# Patient Record
Sex: FEMALE | Race: WHITE | NOT HISPANIC OR LATINO | URBAN - NONMETROPOLITAN AREA
[De-identification: names, ages, dates, MRNs, and addresses within clinical notes are randomized per-mention and may not be internally consistent; named-entity substitution may affect disease eponyms.]

---

## 2023-03-06 DIAGNOSIS — E03.9 HYPOTHYROIDISM, UNSPECIFIED: ICD-10-CM

## 2023-03-09 RX ORDER — LEVOTHYROXINE SODIUM 137 UG/1
TABLET ORAL
Qty: 90 TABLET | Refills: 3 | Status: SHIPPED | OUTPATIENT
Start: 2023-03-09

## 2024-10-04 ENCOUNTER — HOSPITAL ENCOUNTER (OUTPATIENT)
Dept: RADIOLOGY | Facility: HOSPITAL | Age: 44
Discharge: HOME | End: 2024-10-04
Payer: COMMERCIAL

## 2024-10-04 ENCOUNTER — APPOINTMENT (OUTPATIENT)
Dept: RADIOLOGY | Facility: HOSPITAL | Age: 44
End: 2024-10-04

## 2024-10-04 VITALS — HEIGHT: 66 IN | WEIGHT: 212 LBS | BODY MASS INDEX: 34.07 KG/M2

## 2024-10-04 DIAGNOSIS — Z12.31 ENCOUNTER FOR SCREENING MAMMOGRAM FOR MALIGNANT NEOPLASM OF BREAST: ICD-10-CM

## 2024-10-04 DIAGNOSIS — C73 MALIGNANT NEOPLASM OF THYROID GLAND (MULTI): ICD-10-CM

## 2024-10-04 PROCEDURE — 77067 SCR MAMMO BI INCL CAD: CPT

## 2024-10-04 PROCEDURE — 76536 US EXAM OF HEAD AND NECK: CPT

## 2024-11-04 ENCOUNTER — APPOINTMENT (OUTPATIENT)
Dept: OTOLARYNGOLOGY | Facility: CLINIC | Age: 44
End: 2024-11-04
Payer: COMMERCIAL

## 2024-11-04 DIAGNOSIS — H93.13 TINNITUS OF BOTH EARS: Primary | ICD-10-CM

## 2024-11-04 DIAGNOSIS — H91.93 DECREASED HEARING OF BOTH EARS: ICD-10-CM

## 2024-11-04 NOTE — PROGRESS NOTES
"History Of Present Illness  Trice Magana is a 44 y.o. female presenting with: \"  \".  She is kindly referred by .      Past Medical History  She has a past medical history of Personal history of other endocrine, nutritional and metabolic disease (10/30/2020).    Surgical History  She has a past surgical history that includes Other surgical history (03/30/2020) and Hysterectomy.     Social History  She has no history on file for tobacco use, alcohol use, and drug use.    Family History  No family history on file.     Allergies  Patient has no allergy information on record.    Review of Systems        Physical Exam    General appearance: Healthy-appearing, well-nourished, well groomed, in no acute distress.     Head and Face: Atraumatic with no masses, lesions, or scarring.      Salivary glands: No tenderness of the parotid glands or parotid masses.     No tenderness of the submandibular glands or submandibular masses.      Facial strength: Normal strength and symmetry, no synkinesis or facial tic.     Eyes: Conjunctivas look non-hyperemic bilaterally    Ears: Bilaterally ear canals look normal. Tympanic membranes look intact, no hyperemia, fluid or retraction. Hearing grossly normal.      Nose: Mucosa looks normal. No purulent discharge. Septum essentially straight.     Oral Cavity/Mouth: Lips and tongue look normal.     Throat: No postnasal discharge. No tonsil hypertrophy. No hyperemia.    Neck: Symmetrical, trachea midline.     Pulmonary: Normal respiratory effort.     Lymphatic: No palpable pathologic lymph nodes at neck.     Neurological/Psychiatric Orientation to person, place, and time: Normal.     Mood and affect: Normal.      Extremities: No clubbing.     Skin: No significant skin lesions were noted at face or neck        Procedure         Last Recorded Vitals  There were no vitals taken for this visit.    Relevant Results    Assessment and Plan:    Srikanth Crabtree  Otolaryngology - Head & Neck " Surgery

## 2024-11-04 NOTE — PROGRESS NOTES
History Of Present Illness  Trice Magana is a 44 y.o. female, who presents for decreased hearing and tinnitus.  She has constant tinnitus in her ears for about six months.   When she chews on her right side, she hears a high pitch sound in her right ear.    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  Uses bupropion, omeprazole (may cause tinnitus), levothyroxine, vit D2,  She has DM type 2, uses farxiga.    On examination, TMs look intact bilaterally. No visible infection, retraction or effusion.    Plan:  1- hearing test      Past Medical History  She has a past medical history of Personal history of other endocrine, nutritional and metabolic disease (10/30/2020).    Surgical History  She has a past surgical history that includes Other surgical history (03/30/2020) and Hysterectomy.     Social History  She has no history on file for tobacco use, alcohol use, and drug use.    Family History  No family history on file.     Allergies  Patient has no allergy information on record.    Review of Systems   Difficulty with hearing  Ear pain     Physical Exam    General appearance: Healthy-appearing, well-nourished, well groomed, in no acute distress.     Head and Face: Atraumatic with no masses, lesions, or scarring.      Salivary glands: No tenderness of the parotid glands or parotid masses.     No tenderness of the submandibular glands or submandibular masses.      Facial strength: Normal strength and symmetry, no synkinesis or facial tic.     Eyes: Conjunctivas look non-hyperemic bilaterally    Ears: Bilaterally ear canals look normal. Tympanic membranes look intact, no hyperemia, fluid or retraction. Hearing grossly normal.      Nose: Mucosa looks normal. No purulent discharge.     Oral Cavity/Mouth: Lips and tongue look normal.     Throat: No postnasal discharge. No tonsil hypertrophy. No hyperemia.    Neck: Symmetrical, trachea midline.     Pulmonary: Normal respiratory effort.     Lymphatic: No palpable  pathologic lymph nodes at neck.     Neurological/Psychiatric Orientation to person, place, and time: Normal.     Mood and affect: Normal.      Extremities: No clubbing.     Skin: No significant skin lesions were noted at face or neck        Procedure       Last Recorded Vitals  There were no vitals taken for this visit.    Relevant Results  Prior to Admission medications    Medication Sig Start Date End Date Taking? Authorizing Provider   levothyroxine (Synthroid, Levoxyl) 137 mcg tablet TAKE 1 TABLET BY MOUTH ONCE DAILY 3/9/23   Taryn Massey,      No results found.      Assessment and Plan:  Trice Magana is a 44 y.o. female, who presents for decreased hearing and tinnitus.  She has constant tinnitus in her ears for about six months.   When she chews on her right side, she hears a high pitch sound in her right ear.    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  Uses bupropion, omeprazole, levothyroxine, vit D2,  She has DM type 2, uses farxiga.    On examination, TMs look intact bilaterally. No visible infection, retraction or effusion.    Plan:  1- hearing test      Srikanth Crabtree MD  Otolaryngology - Head & Neck Surgery

## 2024-12-02 ENCOUNTER — APPOINTMENT (OUTPATIENT)
Dept: AUDIOLOGY | Facility: CLINIC | Age: 44
End: 2024-12-02
Payer: COMMERCIAL

## 2024-12-02 ENCOUNTER — APPOINTMENT (OUTPATIENT)
Dept: OTOLARYNGOLOGY | Facility: CLINIC | Age: 44
End: 2024-12-02
Payer: COMMERCIAL

## 2024-12-23 ENCOUNTER — APPOINTMENT (OUTPATIENT)
Dept: AUDIOLOGY | Facility: CLINIC | Age: 44
End: 2024-12-23
Payer: COMMERCIAL

## 2024-12-23 ENCOUNTER — APPOINTMENT (OUTPATIENT)
Dept: OTOLARYNGOLOGY | Facility: CLINIC | Age: 44
End: 2024-12-23
Payer: COMMERCIAL

## 2024-12-23 DIAGNOSIS — R94.120 ABNORMAL OTOACOUSTIC EMISSIONS TEST: ICD-10-CM

## 2024-12-23 DIAGNOSIS — H90.3 SENSORINEURAL HEARING LOSS (SNHL) OF BOTH EARS: Primary | ICD-10-CM

## 2024-12-23 DIAGNOSIS — H61.21 IMPACTED CERUMEN OF RIGHT EAR: ICD-10-CM

## 2024-12-23 DIAGNOSIS — H93.19 TINNITUS, UNSPECIFIED LATERALITY: ICD-10-CM

## 2024-12-23 DIAGNOSIS — H93.13 SUBJECTIVE TINNITUS OF BOTH EARS: ICD-10-CM

## 2024-12-23 PROCEDURE — 92557 COMPREHENSIVE HEARING TEST: CPT | Performed by: AUDIOLOGIST

## 2024-12-23 PROCEDURE — 92550 TYMPANOMETRY & REFLEX THRESH: CPT | Performed by: AUDIOLOGIST

## 2024-12-23 PROCEDURE — 99212 OFFICE O/P EST SF 10 MIN: CPT | Performed by: OTOLARYNGOLOGY

## 2024-12-23 PROCEDURE — 69210 REMOVE IMPACTED EAR WAX UNI: CPT | Performed by: OTOLARYNGOLOGY

## 2024-12-23 NOTE — PROGRESS NOTES
History Of Present Illness    12.23.2024: Wax was cleaned from right ear canal. She had her hearing test today, which shows presbycusis pattern of sensorineural hearing loss bilaterally.    Plan  1-consider hearing aids  2-consider Ménière's tinnitus treatment  _________________________________________________________________    Trice Magana is a 44 y.o. female, who presents for decreased hearing and tinnitus.  She has constant tinnitus in her ears for about six months.   When she chews on her right side, she hears a high pitch sound in her right ear.    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  Uses bupropion, omeprazole (may cause tinnitus), levothyroxine, vit D2,  She has DM type 2, uses farxiga.    On examination, TMs look intact bilaterally. No visible infection, retraction or effusion.    Plan:  1- hearing test      Past Medical History  She has a past medical history of Personal history of other endocrine, nutritional and metabolic disease (10/30/2020).    Surgical History  She has a past surgical history that includes Other surgical history (03/30/2020) and Hysterectomy.     Social History   has no history on file for tobacco use, alcohol use, and drug use.    Family History  No family history on file.     Allergies  Patient has no allergy information on record.    Review of Systems (initial ROS)  Difficulty with hearing  Ear pain     Physical Exam (initial exam)    General appearance: Healthy-appearing, well-nourished, well groomed, in no acute distress.     Head and Face: Atraumatic with no masses, lesions, or scarring.      Salivary glands: No tenderness of the parotid glands or parotid masses.     No tenderness of the submandibular glands or submandibular masses.      Facial strength: Normal strength and symmetry, no synkinesis or facial tic.     Eyes: Conjunctivas look non-hyperemic bilaterally    Ears: Bilaterally ear canals look normal. Tympanic membranes look intact, no hyperemia,  fluid or retraction. Hearing grossly normal.      Nose: Mucosa looks normal. No purulent discharge.     Oral Cavity/Mouth: Lips and tongue look normal.     Throat: No postnasal discharge. No tonsil hypertrophy. No hyperemia.    Neck: Symmetrical, trachea midline.     Pulmonary: Normal respiratory effort.     Lymphatic: No palpable pathologic lymph nodes at neck.     Neurological/Psychiatric Orientation to person, place, and time: Normal.     Mood and affect: Normal.      Extremities: No clubbing.     Skin: No significant skin lesions were noted at face or neck      Procedure  EAR WAX REMOVAL 12.23.2024  Patient had right ear wax. Using small instrument(s) and/or suction cleaning was done. Patient tolerated the procedure well.        Last Recorded Vitals  There were no vitals taken for this visit.    Relevant Results  Prior to Admission medications    Medication Sig Start Date End Date Taking? Authorizing Provider   levothyroxine (Synthroid, Levoxyl) 137 mcg tablet TAKE 1 TABLET BY MOUTH ONCE DAILY 3/9/23   Taryn Massey, DO     No results found.      Assessment and Plan:    12.23.2024: Wax was cleaned from right ear canal. She had her hearing test today, which shows presbycusis pattern of sensorineural hearing loss bilaterally.    Plan  1-consider hearing aids  2-consider Ménière's tinnitus treatment  _________________________________________________________________    Trice Magana is a 44 y.o. female, who presents for decreased hearing and tinnitus.  She has constant tinnitus in her ears for about six months.   When she chews on her right side, she hears a high pitch sound in her right ear.    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  Uses bupropion, omeprazole, levothyroxine, vit D2,  She has DM type 2, uses farxiga.    On examination, TMs look intact bilaterally. No visible infection, retraction or effusion.    Plan:  1- hearing test      Srikanth Crabtree MD  Otolaryngology - Head & Neck  Surgery

## 2024-12-23 NOTE — PROGRESS NOTES
Trice Magana, age 44 years, is here today for a hearing evaluation.  Type 2 diabetes diagnosis and history of thyroid cancer    Difficulty hearing - yes, both ears for the past couple of years  Tinnitus - yes, both ears for the past 8 months  Excessive noise exposure - yes, occupational ( for the past 2 years)  Chronic ear infections - no  Ear pain - yes, left ear for the past 5 days  Ear drainage - no  Past ear surgery - no  Vertigo - no  Dizziness - no  Past hearing aid use - no  Family history - yes, father and paternal grandfather/grandmother    Appointment time: 11:50-12:30    Otoscopy revealed clear ear canals with visual inspection of the tympanic membranes bilaterally.    Behavioral hearing evaluation:  Right ear - borderline mild sloping to severe sensorineural hearing loss 125-8000 Hz  Left ear - borderline mild sloping to profound sensorineural hearing loss 125-8000 Hz    Speech reception thresholds obtained at a level consistent with pure tone thresholds bilaterally.    Word discrimination:  Right ear - excellent (96%)  Left ear - excellent (92%)    Tympanometry:  Right ear - Type A, normal middle ear function  Left ear - Type A, normal middle ear function    Ipsilateral acoustic reflexes:  Probe right - present 500-4000 Hz  Probe left - present 500-4000 Hz    Contralateral acoustic reflexes:  Probe right - present 500-4000 Hz  Probe left - present 500-4000 Hz  The presence of acoustic reflexes within normal intensity limits is consistent with normal middle ear and brainstem function, and suggests that auditory sensitivity is not significantly impaired.     Distortion Product Otoacoustic Emissions (DPOAEs):  Right ear - absent 8610-0650 Hz  Left ear - present at 2000 Hz and absent 7769-2336 Hz  Present DPOAEs are consistent with normal cochlear outer hair cell function at those frequencies    Recommendations:  1) Re-evaluate hearing annually, to monitor, or sooner if a change in hearing is  noted  2) Follow up with Dr Crabtree regarding bilateral, sensorineural hearing loss  3) Hearing aids for both ears - she will need to be fit at an outside facility as we are not in network for hearing aids through her insurance

## 2025-05-28 ENCOUNTER — OFFICE VISIT (OUTPATIENT)
Facility: CLINIC | Age: 45
End: 2025-05-28
Payer: COMMERCIAL

## 2025-05-28 VITALS
RESPIRATION RATE: 16 BRPM | DIASTOLIC BLOOD PRESSURE: 70 MMHG | WEIGHT: 198.6 LBS | SYSTOLIC BLOOD PRESSURE: 112 MMHG | HEART RATE: 78 BPM | HEIGHT: 66 IN | BODY MASS INDEX: 31.92 KG/M2

## 2025-05-28 DIAGNOSIS — C73 THYROID CANCER (MULTI): ICD-10-CM

## 2025-05-28 DIAGNOSIS — E11.9 TYPE 2 DIABETES MELLITUS WITHOUT COMPLICATION, WITHOUT LONG-TERM CURRENT USE OF INSULIN: Primary | ICD-10-CM

## 2025-05-28 DIAGNOSIS — E89.0 POSTOPERATIVE HYPOTHYROIDISM: ICD-10-CM

## 2025-05-28 PROBLEM — E66.9 OBESITY: Status: ACTIVE | Noted: 2017-03-24

## 2025-05-28 PROBLEM — L68.0 HIRSUTISM: Status: ACTIVE | Noted: 2025-05-28

## 2025-05-28 PROCEDURE — 3074F SYST BP LT 130 MM HG: CPT | Performed by: INTERNAL MEDICINE

## 2025-05-28 PROCEDURE — 99214 OFFICE O/P EST MOD 30 MIN: CPT | Performed by: INTERNAL MEDICINE

## 2025-05-28 PROCEDURE — 3008F BODY MASS INDEX DOCD: CPT | Performed by: INTERNAL MEDICINE

## 2025-05-28 PROCEDURE — 99204 OFFICE O/P NEW MOD 45 MIN: CPT | Performed by: INTERNAL MEDICINE

## 2025-05-28 PROCEDURE — 3078F DIAST BP <80 MM HG: CPT | Performed by: INTERNAL MEDICINE

## 2025-05-28 RX ORDER — FAMOTIDINE 40 MG/1
TABLET, FILM COATED ORAL
COMMUNITY
Start: 2025-04-14

## 2025-05-28 RX ORDER — DAPAGLIFLOZIN 5 MG/1
TABLET, FILM COATED ORAL
COMMUNITY
Start: 2025-05-13

## 2025-05-28 RX ORDER — OMEPRAZOLE 40 MG/1
1 CAPSULE, DELAYED RELEASE ORAL
COMMUNITY
Start: 2025-05-13

## 2025-05-28 RX ORDER — ERGOCALCIFEROL 1.25 MG/1
1.25 CAPSULE ORAL
COMMUNITY

## 2025-05-28 RX ORDER — BUPROPION HYDROCHLORIDE 150 MG/1
150 TABLET ORAL DAILY
COMMUNITY

## 2025-05-28 RX ORDER — TIRZEPATIDE 2.5 MG/.5ML
2.5 INJECTION, SOLUTION SUBCUTANEOUS
Qty: 2 ML | Refills: 0 | Status: SHIPPED | OUTPATIENT
Start: 2025-05-28 | End: 2026-05-28

## 2025-05-28 RX ORDER — BUPROPION HYDROCHLORIDE 300 MG/1
300 TABLET ORAL DAILY
COMMUNITY

## 2025-05-28 ASSESSMENT — ENCOUNTER SYMPTOMS
NAUSEA: 0
VOMITING: 0
FEVER: 0
HEADACHES: 0
SHORTNESS OF BREATH: 0
DIARRHEA: 0
FATIGUE: 0
CHILLS: 0
PALPITATIONS: 0
COUGH: 0

## 2025-05-28 NOTE — LETTER
May 28, 2025     Skye Bunch PA-C  [address] [facility] [address]    Patient: Trice Magana   YOB: 1980   Date of Visit: 5/28/2025       Dear Dr. Skye Bunch PA-C:    Thank you for referring Trice Magana to me for evaluation. Below are my notes for this consultation.  If you have questions, please do not hesitate to call me. I look forward to following your patient along with you.       Sincerely,     Courtney Handley MD      CC: No Recipients  ______________________________________________________________________________________    Endocrinology New Patient Consult  Subjective  Patient ID: Trice Magana is a 44 y.o. female who presents for Hypothyroidism and Diabetes. Patient was referred by Skye Bunch PA-C    PCP: Skye Bunch PA-C    HPI  44-year-old referred by ESTHER THOMAS for evaluation of hypothyroidism papillary thyroid cancer and type 2 diabetes.  She underwent a total thyroidectomy in 2020 that showed multifocal papillary microcarcinoma with 2 foci on the right : 0.7 and 0.6 cm with no invasion or extension so radioactive iodine was not necessary.  She has had periodic blood work and ultrasound since.  She has been on thyroid medication at 137 mcg for the last few months.  She does take it in the morning on an empty stomach.  She does not take biotin.  Her most recent ultrasound in October 2024 was negative.  She has not had a thyroglobulin in a few years but it has been historically negative.  She has also been type II diabetic for about 2 years.  She was tried on metformin but was intolerant.  She is currently on Farxiga and her most recent A1c was 6.3.  She has struggled quite a bit with her weight and would like to lose.  She has been trying to work on her diet and exercise.  She is due for an eye exam and plans to schedule    Review of Systems   Constitutional:  Negative for  chills, fatigue and fever.   Respiratory:  Negative for cough and shortness of breath.    Cardiovascular:  Negative for chest pain and palpitations.   Gastrointestinal:  Negative for diarrhea, nausea and vomiting.   Neurological:  Negative for headaches.       Problem List[1]    Medical History[2]     Surgical History[3]     Tobacco Use History[4]   Social History     Substance and Sexual Activity   Alcohol Use None      Marital status:   Employment: [employer]     Family History[5]     Home Meds:  Current Outpatient Medications   Medication Instructions   • buPROPion XL (WELLBUTRIN XL) 150 mg, Daily   • buPROPion XL (WELLBUTRIN XL) 300 mg, Daily   • ergocalciferol (VITAMIN D-2) 1.25 mg, Once Weekly   • famotidine (Pepcid) 40 mg tablet TAKE 1 TABLET BY MOUTH ONCE DAILY FOR ACID REFLUX   • Farxiga 5 mg tablet TAKE 1 TABLET BY MOUTH ONCE DAILY FOR BLOOD SUGAR   • levothyroxine (Synthroid, Levoxyl) 137 mcg tablet TAKE 1 TABLET BY MOUTH ONCE DAILY   • omeprazole (PriLOSEC) 40 mg DR capsule 1 capsule, Daily (0630)        RX Allergies[6]     Objective  Vitals:    05/28/25 1202   BP: 112/70   Pulse: 78   Resp: 16      Vitals:    05/28/25 1202   Weight: 90.1 kg (198 lb 9.6 oz)      Body mass index is 32.05 kg/m².   Physical Exam  Constitutional:       Appearance: Normal appearance. She is overweight.   HENT:      Head: Normocephalic and atraumatic.   Neck:      Comments: No palpable thyroid gland  Cardiovascular:      Rate and Rhythm: Normal rate and regular rhythm.      Heart sounds: No murmur heard.     No gallop.   Pulmonary:      Effort: Pulmonary effort is normal.      Breath sounds: Normal breath sounds.   Abdominal:      Palpations: Abdomen is soft.      Comments: benign   Neurological:      General: No focal deficit present.      Mental Status: She is alert and oriented to person, place, and time.      Deep Tendon Reflexes: Reflexes are normal and symmetric.   Psychiatric:         Behavior: Behavior is  "cooperative.         Labs:  Lab Results   Component Value Date    HGBA1C 7.1 (A) 09/08/2022    TSH 1.01 09/08/2022    FREET4 1.31 (H) 06/15/2020      No results found for: \"PR1\", \"THYROIDPAB\", \"TSI\"     Assessment/Plan  Assessment & Plan  Type 2 diabetes mellitus without complication, without long-term current use of insulin         Postoperative hypothyroidism         Thyroid cancer (Multi)    44-year-old here for evaluation of hypothyroidism status post total thyroidectomy for thyroid cancer.  Also with controlled type 2 diabetes.  We discussed her course.  We reviewed usual course of thyroid cancer management.  I would recommend a year 5 ultrasound later this year and then yearly blood work with thyroglobulin testing will be sufficient.  I will put in her ultrasound today for later this year and we will do thyroid blood work in the near future as she likely needs further adjustment in her dosing.  We will include a thyroglobulin with TSH.  In regards to her diabetes she has been struggling with her weight and she would like to pursue GLP-1 agents.  She has no personal or family history of medullary thyroid cancer  or a personal history of pancreatitis so she would be a candidate.  We will call in [facility] for her.  We did discuss monthly titration.  I encouraged her to work on diet and exercise.  I will see her back in 6 months    Electronically signed by:  Courtney Handley MD 05/28/25  9:22 PM         [1]  Patient Active Problem List  Diagnosis   • Diabetes type 2   • Dyslipidemia   • Elevated fasting blood sugar   • Elevated testosterone level in female   • Gastroesophageal reflux disease   • Hirsutism   • Hypothyroidism   • Multiple thyroid nodules   • Obesity   • Thyroid cancer (Multi)   • Tobacco use disorder   • Vitamin D deficiency   [2]  Past Medical History:  Diagnosis Date   • Personal history of other endocrine, nutritional and metabolic disease 10/30/2020    History of thyroid nodule   [3]  Past Surgical " History:  Procedure Laterality Date   • HYSTERECTOMY     • OTHER SURGICAL HISTORY  03/30/2020    Hysterectomy   [4]  Social History  Tobacco Use   Smoking Status Every Day   • Types: Cigarettes   Smokeless Tobacco Never   Tobacco Comments    Pt vapes   [5]  No family history on file.  [6]  No Known Allergies       [1]  Patient Active Problem List  Diagnosis   • Diabetes type 2   • Dyslipidemia   • Elevated fasting blood sugar   • Elevated testosterone level in female   • Gastroesophageal reflux disease   • Hirsutism   • Hypothyroidism   • Multiple thyroid nodules   • Obesity   • Thyroid cancer (Multi)   • Tobacco use disorder   • Vitamin D deficiency   [2]  Past Medical History:  Diagnosis Date   • Personal history of other endocrine, nutritional and metabolic disease 10/30/2020    History of thyroid nodule   [3]  Past Surgical History:  Procedure Laterality Date   • HYSTERECTOMY     • OTHER SURGICAL HISTORY  03/30/2020    Hysterectomy   [4]  Social History  Tobacco Use   Smoking Status Every Day   • Types: Cigarettes   Smokeless Tobacco Never   Tobacco Comments    Pt vapes   [5]  No family history on file.  [6]  No Known Allergies

## 2025-05-28 NOTE — PROGRESS NOTES
Endocrinology New Patient Consult  Subjective   Patient ID: Trice Magana is a 44 y.o. female who presents for Hypothyroidism and Diabetes. Patient was referred by Skye Bunch PA-C    PCP: Skye Bunch PA-C    HPI  44-year-old referred by ESTHER THOMAS for evaluation of hypothyroidism papillary thyroid cancer and type 2 diabetes.  She underwent a total thyroidectomy in 2020 that showed multifocal papillary microcarcinoma with 2 foci on the right : 0.7 and 0.6 cm with no invasion or extension so radioactive iodine was not necessary.  She has had periodic blood work and ultrasound since.  She has been on thyroid medication at 137 mcg for the last few months.  She does take it in the morning on an empty stomach.  She does not take biotin.  Her most recent ultrasound in October 2024 was negative.  She has not had a thyroglobulin in a few years but it has been historically negative.  She has also been type II diabetic for about 2 years.  She was tried on metformin but was intolerant.  She is currently on Farxiga and her most recent A1c was 6.3.  She has struggled quite a bit with her weight and would like to lose.  She has been trying to work on her diet and exercise.  She is due for an eye exam and plans to schedule    Review of Systems   Constitutional:  Negative for chills, fatigue and fever.   Respiratory:  Negative for cough and shortness of breath.    Cardiovascular:  Negative for chest pain and palpitations.   Gastrointestinal:  Negative for diarrhea, nausea and vomiting.   Neurological:  Negative for headaches.       Problem List[1]    Medical History[2]     Surgical History[3]     Tobacco Use History[4]   Social History     Substance and Sexual Activity   Alcohol Use None      Marital status:   Employment: [employer]     Family History[5]     Home Meds:  Current Outpatient Medications   Medication Instructions    buPROPion XL (WELLBUTRIN XL) 150 mg, Daily    buPROPion XL  "(WELLBUTRIN XL) 300 mg, Daily    ergocalciferol (VITAMIN D-2) 1.25 mg, Once Weekly    famotidine (Pepcid) 40 mg tablet TAKE 1 TABLET BY MOUTH ONCE DAILY FOR ACID REFLUX    Farxiga 5 mg tablet TAKE 1 TABLET BY MOUTH ONCE DAILY FOR BLOOD SUGAR    levothyroxine (Synthroid, Levoxyl) 137 mcg tablet TAKE 1 TABLET BY MOUTH ONCE DAILY    omeprazole (PriLOSEC) 40 mg DR capsule 1 capsule, Daily (0630)        RX Allergies[6]     Objective   Vitals:    05/28/25 1202   BP: 112/70   Pulse: 78   Resp: 16      Vitals:    05/28/25 1202   Weight: 90.1 kg (198 lb 9.6 oz)      Body mass index is 32.05 kg/m².   Physical Exam  Constitutional:       Appearance: Normal appearance. She is overweight.   HENT:      Head: Normocephalic and atraumatic.   Neck:      Comments: No palpable thyroid gland  Cardiovascular:      Rate and Rhythm: Normal rate and regular rhythm.      Heart sounds: No murmur heard.     No gallop.   Pulmonary:      Effort: Pulmonary effort is normal.      Breath sounds: Normal breath sounds.   Abdominal:      Palpations: Abdomen is soft.      Comments: benign   Neurological:      General: No focal deficit present.      Mental Status: She is alert and oriented to person, place, and time.      Deep Tendon Reflexes: Reflexes are normal and symmetric.   Psychiatric:         Behavior: Behavior is cooperative.         Labs:  Lab Results   Component Value Date    HGBA1C 7.1 (A) 09/08/2022    TSH 1.01 09/08/2022    FREET4 1.31 (H) 06/15/2020      No results found for: \"PR1\", \"THYROIDPAB\", \"TSI\"     Assessment/Plan   Assessment & Plan  Type 2 diabetes mellitus without complication, without long-term current use of insulin         Postoperative hypothyroidism         Thyroid cancer (Multi)    44-year-old here for evaluation of hypothyroidism status post total thyroidectomy for thyroid cancer.  Also with controlled type 2 diabetes.  We discussed her course.  We reviewed usual course of thyroid cancer management.  I would recommend a " year 5 ultrasound later this year and then yearly blood work with thyroglobulin testing will be sufficient.  I will put in her ultrasound today for later this year and we will do thyroid blood work in the near future as she likely needs further adjustment in her dosing.  We will include a thyroglobulin with TSH.  In regards to her diabetes she has been struggling with her weight and she would like to pursue GLP-1 agents.  She has no personal or family history of medullary thyroid cancer  or a personal history of pancreatitis so she would be a candidate.  We will call in [facility] for her.  We did discuss monthly titration.  I encouraged her to work on diet and exercise.  I will see her back in 6 months    Electronically signed by:  Courtney Handley MD 05/28/25  9:22 PM         [1]   Patient Active Problem List  Diagnosis    Diabetes type 2    Dyslipidemia    Elevated fasting blood sugar    Elevated testosterone level in female    Gastroesophageal reflux disease    Hirsutism    Hypothyroidism    Multiple thyroid nodules    Obesity    Thyroid cancer (Multi)    Tobacco use disorder    Vitamin D deficiency   [2]   Past Medical History:  Diagnosis Date    Personal history of other endocrine, nutritional and metabolic disease 10/30/2020    History of thyroid nodule   [3]   Past Surgical History:  Procedure Laterality Date    HYSTERECTOMY      OTHER SURGICAL HISTORY  03/30/2020    Hysterectomy   [4]   Social History  Tobacco Use   Smoking Status Every Day    Types: Cigarettes   Smokeless Tobacco Never   Tobacco Comments    Pt vapes   [5] No family history on file.  [6] No Known Allergies

## 2025-05-29 NOTE — PATIENT INSTRUCTIONS
Thyroid blood work when able  Adjustment based on levels  Please schedule neck ultrasound for October  Crissy will be called in for you  Please call or message with any concerns or questions otherwise follow-up in 6 months  Try to work on eating and exercise as discussed

## 2025-05-29 NOTE — ASSESSMENT & PLAN NOTE
44-year-old here for evaluation of hypothyroidism status post total thyroidectomy for thyroid cancer.  Also with controlled type 2 diabetes.  We discussed her course.  We reviewed usual course of thyroid cancer management.  I would recommend a year 5 ultrasound later this year and then yearly blood work with thyroglobulin testing will be sufficient.  I will put in her ultrasound today for later this year and we will do thyroid blood work in the near future as she likely needs further adjustment in her dosing.  We will include a thyroglobulin with TSH.  In regards to her diabetes she has been struggling with her weight and she would like to pursue GLP-1 agents.  She has no personal or family history of medullary thyroid cancer  or a personal history of pancreatitis so she would be a candidate.  We will call in [facility] for her.  We did discuss monthly titration.  I encouraged her to work on diet and exercise.  I will see her back in 6 months

## 2025-06-10 ENCOUNTER — OFFICE VISIT (OUTPATIENT)
Dept: OPHTHALMOLOGY | Facility: CLINIC | Age: 45
End: 2025-06-10
Payer: COMMERCIAL

## 2025-06-10 DIAGNOSIS — H52.4 PRESBYOPIA: ICD-10-CM

## 2025-06-10 DIAGNOSIS — H52.13 MYOPIA OF BOTH EYES: Primary | ICD-10-CM

## 2025-06-10 DIAGNOSIS — E11.9 TYPE 2 DIABETES MELLITUS WITHOUT COMPLICATION, WITHOUT LONG-TERM CURRENT USE OF INSULIN: ICD-10-CM

## 2025-06-10 PROCEDURE — 92015 DETERMINE REFRACTIVE STATE: CPT | Performed by: OPTOMETRIST

## 2025-06-10 PROCEDURE — 92004 COMPRE OPH EXAM NEW PT 1/>: CPT | Performed by: OPTOMETRIST

## 2025-06-10 ASSESSMENT — EXTERNAL EXAM - LEFT EYE: OS_EXAM: NORMAL

## 2025-06-10 ASSESSMENT — REFRACTION_MANIFEST
OS_AXIS: 061
METHOD_AUTOREFRACTION: 1
OD_AXIS: 134
OD_ADD: +1.75
OS_SPHERE: -2.00
OD_CYLINDER: +0.25
OS_SPHERE: -1.25
OS_ADD: +1.75
OD_AXIS: 140
OD_SPHERE: -1.75
OS_CYLINDER: +0.50
OD_SPHERE: -1.00
OD_CYLINDER: +0.50
OS_CYLINDER: +0.25
OS_AXIS: 061

## 2025-06-10 ASSESSMENT — REFRACTION_WEARINGRX
OS_SPHERE: -1.00
OS_CYLINDER: SPHERE
OD_AXIS: 041
OD_SPHERE: -1.00
OD_CYLINDER: -0.25

## 2025-06-10 ASSESSMENT — EXTERNAL EXAM - RIGHT EYE: OD_EXAM: NORMAL

## 2025-06-10 ASSESSMENT — TONOMETRY
OS_IOP_MMHG: 22
OD_IOP_MMHG: 22
IOP_METHOD: TONOPEN

## 2025-06-10 ASSESSMENT — REFRACTION
OS_CYLINDER: +0.50
OD_AXIS: 140
OS_SPHERE: -1.25
OD_SPHERE: -0.75
OS_AXIS: 060
OD_CYLINDER: +0.25

## 2025-06-10 ASSESSMENT — VISUAL ACUITY
CORRECTION_TYPE: GLASSES
OS_CC: 20/20
METHOD: SNELLEN - LINEAR
OD_CC: 20/20

## 2025-06-10 ASSESSMENT — CONF VISUAL FIELD
OD_INFERIOR_NASAL_RESTRICTION: 0
METHOD: COUNTING FINGERS
OD_SUPERIOR_NASAL_RESTRICTION: 0
OS_INFERIOR_NASAL_RESTRICTION: 0
OD_SUPERIOR_TEMPORAL_RESTRICTION: 0
OS_NORMAL: 1
OS_SUPERIOR_TEMPORAL_RESTRICTION: 0
OD_INFERIOR_TEMPORAL_RESTRICTION: 0
OS_INFERIOR_TEMPORAL_RESTRICTION: 0
OS_SUPERIOR_NASAL_RESTRICTION: 0
OD_NORMAL: 1

## 2025-06-10 ASSESSMENT — SLIT LAMP EXAM - LIDS
COMMENTS: NORMAL
COMMENTS: NORMAL

## 2025-06-10 ASSESSMENT — CUP TO DISC RATIO
OD_RATIO: 0.35
OS_RATIO: 0.3

## 2025-06-10 ASSESSMENT — ENCOUNTER SYMPTOMS
RESPIRATORY NEGATIVE: 0
CONSTITUTIONAL NEGATIVE: 0
MUSCULOSKELETAL NEGATIVE: 0
HEMATOLOGIC/LYMPHATIC NEGATIVE: 0
PSYCHIATRIC NEGATIVE: 0
NEUROLOGICAL NEGATIVE: 0
ENDOCRINE NEGATIVE: 0
ALLERGIC/IMMUNOLOGIC NEGATIVE: 0
CARDIOVASCULAR NEGATIVE: 0
GASTROINTESTINAL NEGATIVE: 0
EYES NEGATIVE: 1

## 2025-06-10 NOTE — PROGRESS NOTES
Assessment/Plan   Diagnoses and all orders for this visit:  Myopia of both eyes  Presbyopia  New spec rx released today per patient request. Ocular health wnl for age OU. Monitor 1 year or sooner prn. Refraction billed today. Pt consents to receiving glasses Rx today. Patient's/guardian's signature obtained to acknowledge and confirm that a paper copy of glasses Rx was given to patient in compliance with [facility] Eyeglass Rule. Electronic copy of Rx will also be available via [employer].   Option for DISTANCE VISION ONLY per patient request. Can remove glasses for reading.     Type 2 diabetes mellitus without complication, without long-term current use of insulin  -     Return visit; Future  The patient has diabetes without any evidence of retinopathy.  The patient was advised to maintain tight glucose control, tight blood pressure control, and favorable levels of cholesterol, low density lipoprotein, and high density lipoproteins.  Follow up in one year was recommended.

## 2025-06-12 LAB
T4 FREE SERPL-MCNC: 1.8 NG/DL (ref 0.8–1.8)
THYROGLOB AB SERPL-ACNC: NORMAL [IU]/ML
TSH SERPL-ACNC: 0.89 MIU/L

## 2025-06-15 DIAGNOSIS — E11.9 TYPE 2 DIABETES MELLITUS WITHOUT COMPLICATION, WITHOUT LONG-TERM CURRENT USE OF INSULIN: Primary | ICD-10-CM

## 2025-06-15 RX ORDER — TIRZEPATIDE 5 MG/.5ML
5 INJECTION, SOLUTION SUBCUTANEOUS
Qty: 2 ML | Refills: 0 | Status: SHIPPED | OUTPATIENT
Start: 2025-06-15

## 2025-06-16 LAB
T4 FREE SERPL-MCNC: 1.8 NG/DL (ref 0.8–1.8)
THYROGLOB AB SERPL-ACNC: <1 IU/ML
THYROGLOB SERPL-MCNC: <0.1 NG/ML
TSH SERPL-ACNC: 0.89 MIU/L

## 2025-07-08 DIAGNOSIS — E11.9 TYPE 2 DIABETES MELLITUS WITHOUT COMPLICATION, WITHOUT LONG-TERM CURRENT USE OF INSULIN: ICD-10-CM

## 2025-07-08 RX ORDER — TIRZEPATIDE 2.5 MG/.5ML
2.5 INJECTION, SOLUTION SUBCUTANEOUS
Qty: 2 ML | Refills: 0 | Status: SHIPPED | OUTPATIENT
Start: 2025-07-08 | End: 2026-07-08

## 2025-08-07 DIAGNOSIS — E11.9 TYPE 2 DIABETES MELLITUS WITHOUT COMPLICATION, WITHOUT LONG-TERM CURRENT USE OF INSULIN: ICD-10-CM

## 2025-08-07 RX ORDER — TIRZEPATIDE 2.5 MG/.5ML
INJECTION, SOLUTION SUBCUTANEOUS
Qty: 2 ML | Refills: 0 | Status: SHIPPED | OUTPATIENT
Start: 2025-08-07

## 2025-10-06 ENCOUNTER — APPOINTMENT (OUTPATIENT)
Dept: RADIOLOGY | Facility: HOSPITAL | Age: 45
End: 2025-10-06
Payer: COMMERCIAL

## 2026-06-11 ENCOUNTER — APPOINTMENT (OUTPATIENT)
Dept: OPHTHALMOLOGY | Facility: CLINIC | Age: 46
End: 2026-06-11
Payer: COMMERCIAL